# Patient Record
Sex: MALE | Race: WHITE
[De-identification: names, ages, dates, MRNs, and addresses within clinical notes are randomized per-mention and may not be internally consistent; named-entity substitution may affect disease eponyms.]

---

## 2019-12-03 ENCOUNTER — HOSPITAL ENCOUNTER (OUTPATIENT)
Dept: HOSPITAL 88 - OR | Age: 49
Discharge: HOME | End: 2019-12-03
Attending: OTOLARYNGOLOGY
Payer: COMMERCIAL

## 2019-12-03 VITALS — SYSTOLIC BLOOD PRESSURE: 114 MMHG | DIASTOLIC BLOOD PRESSURE: 85 MMHG

## 2019-12-03 DIAGNOSIS — F17.210: ICD-10-CM

## 2019-12-03 DIAGNOSIS — H90.3: Primary | ICD-10-CM

## 2019-12-03 DIAGNOSIS — H93.13: ICD-10-CM

## 2019-12-03 PROCEDURE — 69399 UNLISTED PX EXTERNAL EAR: CPT

## 2019-12-03 PROCEDURE — 93005 ELECTROCARDIOGRAM TRACING: CPT

## 2019-12-03 PROCEDURE — 69436 CREATE EARDRUM OPENING: CPT

## 2019-12-03 NOTE — OPERATIVE REPORT
DATE OF PROCEDURE:  12/03/2019

 

SURGEON:  Shaq Banks MD

 

CHIEF COMPLAINT:  Left-sided sensory hearing loss.

 

POSTOPERATIVE DIAGNOSIS:  Left-sided sensory hearing loss.

 

OPERATIVE PROCEDURES:  Left myringotomy tube with Decadron to the left middle ear cleft,

examination under anesthesia on the right ear. 

 

ANESTHESIA:  Anesthesiology group.

 

INDICATIONS:  This 49-year-old male has a gradual onset of hearing loss in the left ear

over the past two years.  The patient has been treated with systemic steroid antibiotics

with no improvement.  Audiogram showed the patient has severe sensorineural hearing loss

with poor discrimination on the left ear.  He has a high-frequency moderate sensory

hearing loss on the right.  His speech discrimination on the right side was normal.  MRI

of the skull base that was done was negative.  It was decided that left myringotomy tube

with Decadron and other necessary procedure will be beneficial for him. 

 

DESCRIPTION OF PROCEDURE:  The patient was taken to the operating room, put under

general anesthesia.  A LMA airway created.  The left ear was examined.  The ear canal

was debrided.  Myringotomy was done in anterior-inferior quadrant.  No effusion was

noted in the middle ear cleft.  1 mL of Decadron 24 mg/mL was inserted instilled into

the middle ear. 

 

The right ear was examined.  The ear canal was debrided.  No abnormality was noted.  The

TM was not disturbed.  The patient tolerated the above procedure well with minimal blood

loss.  He was able to be transferred to recovery room in stable condition. 

 

 

 

 

______________________________

Shaq Banks MD

 

DK/MODL

D:  12/03/2019 09:09:39

T:  12/03/2019 16:34:40

Job #:  [id number]

## 2019-12-04 NOTE — PRE OP HISTORY & PHYSICAL
CHIEF COMPLAINT:  Left sudden sensorineural hearing loss for 2-1/2 years.

 

HISTORY OF PRESENT ILLNESS:  This 49-year-old male with decreased hearing in the left

ear about two years ago.  He has bilateral tinnitus.  He cannot remember the incidents

of ear problem, how this came about.  The problem was gradual in onset.  The patient

denies any recent upper respiratory tract infection or history of autoimmune problem.

There is no family history of hearing loss.  The patient denies any vertigo.  He denies

any change in hearing on the right side.  He has no previous surgery to the ear.  The

patient has an audiogram that was done, which showed in January of 2019.  The patient

has mild-to-moderate high-frequency sensorineural loss in the right ear with profound

sensorineural hearing loss in the left.  The patient has speech discrimination of 28% on

the left side and 80% on the right.  The patient had an MRI of the skull base, which

showed negative IAC and negative problem with the brain.  The patient has been treated

with prednisone p.o., which was his own choice over the past few months.  The patient

has not been very compliant with regarding followup.  The patient finally returned to

the office in November of this year for further management. 

 

REVIEW OF SYSTEMS:

System review showed no recent cardiovascular, respiratory, or GI problem.

 

PAST MEDICAL HISTORY:  The patient has no significant medical problem.

 

PAST SURGICAL HISTORY:  The patient had previous carpal tunnel in the left hand and

vasectomy. 

 

ALLERGIES:  HE HAS NO KNOWN ALLERGY TO MEDICATION.

 

MEDICATIONS:  He is on Vyvanse.

 

SOCIAL HISTORY:  He smokes about half a pack a day.  He is a nondrinker.

 

FAMILY HISTORY:  Noncontributory.

 

PHYSICAL EXAMINATION:

VITAL SIGNS:  On examination, the patient's vital signs were within normal limits. 

HEENT:  Ears exam showed normal tympanic membrane bilaterally.  Nasal exam show septal

spur on the left side about 20%.  Oropharynx and oral cavity show 2+ tonsils bilaterally

with no exudate or debris.  He has Mallampati level II. 

NECK:  Showed no lymph node or thyroid palpable. 

CHEST:  Showed good air entry bilaterally. 

CARDIOVASCULAR:  Showed S1, S2.  No murmur noted.

ASSESSMENT AND PLAN:  Mr. Saenz has left hearing loss over the past 2-1/2 years.  Since

sudden sensorineural hearing loss, an autoimmune problem cannot be ruled out.  The

patient has not been very compliant in terms of followup.  His condition has been

resistant to conservative therapy, but the suggested treatment is left myringotomy and

tubes with Decadron installation into the left ear and other necessary procedure.

Complication of procedure includes, but not limited to bleeding, infection, TM

perforation, persistent drainage in the ear, hearing loss, persistent recurrence of the

problem.  The alternate will be continue observation, continue 

antibiotic therapy, and systemic steroid therapy.  The patient has elected to undergo

surgical procedure. 

 

 

 

 

______________________________

MD KRISTY Restrepo/MODL

D:  12/02/2019 09:44:24

T:  12/02/2019 10:42:39

Job #:  [id number]